# Patient Record
Sex: MALE | Race: WHITE
[De-identification: names, ages, dates, MRNs, and addresses within clinical notes are randomized per-mention and may not be internally consistent; named-entity substitution may affect disease eponyms.]

---
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
## 2022-08-28 ENCOUNTER — HOSPITAL ENCOUNTER (EMERGENCY)
Dept: HOSPITAL 26 - MED | Age: 22
Discharge: LEFT BEFORE DISCHARGE | End: 2022-08-28
Payer: COMMERCIAL

## 2022-08-28 VITALS — BODY MASS INDEX: 30.66 KG/M2 | WEIGHT: 207 LBS | HEIGHT: 69 IN

## 2022-08-28 VITALS — SYSTOLIC BLOOD PRESSURE: 143 MMHG | DIASTOLIC BLOOD PRESSURE: 68 MMHG

## 2022-08-28 DIAGNOSIS — H11.30: Primary | ICD-10-CM

## 2022-08-28 NOTE — NUR
23 Y/O MALE BIB MOTHER C/O OF REDNESS IN THE SCLERA X3DAYS. DENIES ANY PAIN, 
DISCHARGE, TRAUMA/INJURY. DENIES ANY BLURRING OF VISION





NKA

PMH: DENIES

## 2022-11-03 ENCOUNTER — HOSPITAL ENCOUNTER (EMERGENCY)
Dept: HOSPITAL 26 - MED | Age: 22
Discharge: HOME | End: 2022-11-03
Payer: COMMERCIAL

## 2022-11-03 VITALS — SYSTOLIC BLOOD PRESSURE: 134 MMHG | DIASTOLIC BLOOD PRESSURE: 77 MMHG

## 2022-11-03 VITALS — SYSTOLIC BLOOD PRESSURE: 130 MMHG | DIASTOLIC BLOOD PRESSURE: 90 MMHG

## 2022-11-03 VITALS — WEIGHT: 200 LBS | HEIGHT: 74 IN | BODY MASS INDEX: 25.67 KG/M2

## 2022-11-03 DIAGNOSIS — J18.9: ICD-10-CM

## 2022-11-03 DIAGNOSIS — J11.1: Primary | ICD-10-CM

## 2022-11-03 DIAGNOSIS — Z20.822: ICD-10-CM

## 2022-11-03 PROCEDURE — 87426 SARSCOV CORONAVIRUS AG IA: CPT

## 2022-11-03 PROCEDURE — 87804 INFLUENZA ASSAY W/OPTIC: CPT

## 2022-11-03 PROCEDURE — 71045 X-RAY EXAM CHEST 1 VIEW: CPT

## 2022-11-03 PROCEDURE — 99284 EMERGENCY DEPT VISIT MOD MDM: CPT

## 2022-11-03 NOTE — NUR
22/M PRESENTS TO ED STATING HE WAS REFERRED FROM CLINIC FOR CHEST X RAY TO RULE 
OUT PNUEMONIA. PATIENT C/O INTERMITTENT FEVERS, COUGH AND SORE THROAT X3 DAYS. 
PATIENT STATES HE HAS BEEN TAKING TYLENOL, LAST DOSE 2 HOURS AGO, WITH SOME 
RELIEF. PATIENT DENIES CP, SOB OR RECENT SICK CONTACTS. PATIENTS TEMP UPON 
ARRIVAL TO ED 97.9.

## 2024-03-27 NOTE — NUR
Patient discharged with v/s stable. Written and verbal after care instructions 
ABOUT INFLUENZA AND COMMUNITY ACQUIRED PNEUMONIA given and explained. Patient 
alert, oriented and verbalized understanding of instructions. Ambulatory with 
steady gait. All questions addressed prior to discharge. ID band removed. 
Patient advised to follow up with PMD. Rx of DOXYCYLINE HYCLATE given. Patient 
educated on indication of medication including possible reaction and side 
effects. Opportunity to ask questions provided and answered. Initial (On Arrival)